# Patient Record
Sex: MALE | Race: BLACK OR AFRICAN AMERICAN | Employment: PART TIME | URBAN - METROPOLITAN AREA
[De-identification: names, ages, dates, MRNs, and addresses within clinical notes are randomized per-mention and may not be internally consistent; named-entity substitution may affect disease eponyms.]

---

## 2018-10-23 ENCOUNTER — APPOINTMENT (OUTPATIENT)
Dept: GENERAL RADIOLOGY | Age: 48
End: 2018-10-23
Attending: EMERGENCY MEDICINE
Payer: SELF-PAY

## 2018-10-23 ENCOUNTER — HOSPITAL ENCOUNTER (EMERGENCY)
Age: 48
Discharge: HOME | End: 2018-10-23
Attending: EMERGENCY MEDICINE
Payer: SELF-PAY

## 2018-10-23 VITALS
WEIGHT: 298 LBS | OXYGEN SATURATION: 98 % | HEIGHT: 74 IN | TEMPERATURE: 97.8 F | DIASTOLIC BLOOD PRESSURE: 70 MMHG | RESPIRATION RATE: 16 BRPM | BODY MASS INDEX: 38.24 KG/M2 | SYSTOLIC BLOOD PRESSURE: 113 MMHG | HEART RATE: 56 BPM

## 2018-10-23 DIAGNOSIS — M25.512 PAIN IN JOINT OF LEFT SHOULDER: Primary | ICD-10-CM

## 2018-10-23 LAB — DEPRECATED S PYO AG THROAT QL EIA: NEGATIVE

## 2018-10-23 PROCEDURE — 87880 STREP A ASSAY W/OPTIC: CPT

## 2018-10-23 PROCEDURE — 99283 EMERGENCY DEPT VISIT LOW MDM: CPT | Performed by: NURSE PRACTITIONER

## 2018-10-23 PROCEDURE — 87081 CULTURE SCREEN ONLY: CPT

## 2018-10-23 PROCEDURE — 73030 X-RAY EXAM OF SHOULDER: CPT

## 2018-10-23 RX ORDER — DICLOFENAC SODIUM 50 MG/1
50 TABLET, DELAYED RELEASE ORAL 2 TIMES DAILY
Qty: 14 TAB | Refills: 0 | Status: SHIPPED | OUTPATIENT
Start: 2018-10-23

## 2018-10-23 RX ORDER — KETOROLAC TROMETHAMINE 10 MG/1
10 TABLET, FILM COATED ORAL
Status: DISCONTINUED | OUTPATIENT
Start: 2018-10-23 | End: 2018-10-23 | Stop reason: HOSPADM

## 2018-10-23 NOTE — ED NOTES
I have reviewed discharge instructions with the patient. The patient verbalized understanding. Patient left ED via Discharge Method: ambulatory to Home with self. Opportunity for questions and clarification provided. Patient given 1 scripts. To continue your aftercare when you leave the hospital, you may receive an automated call from our care team to check in on how you are doing. This is a free service and part of our promise to provide the best care and service to meet your aftercare needs.  If you have questions, or wish to unsubscribe from this service please call [phone]. Thank you for Choosing our [facility].

## 2018-10-23 NOTE — ED PROVIDER NOTES
Patient presents with left shoulder pain for the past week. Patient denies known injury. Patient states sore throat also for the past 2 days. He denies fever. The history is provided by the patient. Shoulder Pain The incident occurred more than 2 days ago. There was no injury mechanism. The left shoulder is affected. The pain is at a severity of 10/10. The pain is mild. The pain has been intermittent since onset. The pain does not radiate. There is no history of shoulder injury. He has no other injuries. There is no history of shoulder surgery. He reports no foreign bodies present. No past medical history on file. No past surgical history on file. No family history on file. Social History Socioeconomic History  Marital status: SINGLE Spouse name: Not on file  Number of children: Not on file  Years of education: Not on file  Highest education level: Not on file Social Needs  Financial resource strain: Not on file  Food insecurity - worry: Not on file  Food insecurity - inability: Not on file  Transportation needs - medical: Not on file  Transportation needs - non-medical: Not on file Occupational History  Not on file Tobacco Use  Smoking status: Not on file Substance and Sexual Activity  Alcohol use: Not on file  Drug use: Not on file  Sexual activity: Not on file Other Topics Concern  Not on file Social History Narrative  Not on file ALLERGIES: Tetracycline Review of Systems Constitutional: Negative for chills and fever. HENT: Positive for sore throat. Musculoskeletal: Positive for arthralgias. Vitals:  
 10/23/18 1417 BP: 121/75 Pulse: 86 Resp: 16 Temp: 97.8 °F (36.6 °C) SpO2: 98% Weight: 135.2 kg (298 lb) Height: 6' 2\" (1.88 m) Physical Exam  
Constitutional: He appears well-developed and well-nourished. Non-toxic appearance. He does not appear ill.   
HENT:  
 Mouth/Throat: Uvula is midline. Posterior oropharyngeal erythema present. Tonsils are 2+ on the right. Tonsils are 2+ on the left. No tonsillar exudate. Neck: Normal range of motion. Neck supple. Cardiovascular: Normal rate and regular rhythm. Pulmonary/Chest: Breath sounds normal.  
Musculoskeletal:  
     Left shoulder: He exhibits tenderness and pain. He exhibits normal pulse and normal strength. Nursing note and vitals reviewed. Xr Shoulder Lt Ap/lat Min 2 V Result Date: 10/23/2018 Three-view left shoulder x-ray October 23, 2018 Reference exam: None Indication: Left shoulder pain and swelling beginning one week ago Findings: 3 images are presented, no fracture nor dislocation is seen. The visualized portions of the ribs and lung are normal. There is some joint space narrowing and spurring at the acromiohumeral joint. Impression: Arthritic changes but no acute injury identified. Recent Results (from the past 12 hour(s)) STREP AG SCREEN, GROUP A Collection Time: 10/23/18  2:55 PM  
Result Value Ref Range Group A Strep Ag ID NEGATIVE  NEG    
 
 
MDM Number of Diagnoses or Management Options Pain in joint of left shoulder:  
Diagnosis management comments: Xray negative for acute changes. Strep test negative for strep. Patient given po toradol prior to discharge. Patient given prescription for diclofenac. Amount and/or Complexity of Data Reviewed Clinical lab tests: ordered and reviewed Tests in the radiology section of CPT®: ordered and reviewed Tests in the medicine section of CPT®: ordered Patient Progress Patient progress: stable Procedures

## 2018-10-23 NOTE — LETTER
[address] [facility] EMERGENCY DEPT [address] [address] 
[phone] Work/School Note Date: 10/23/2018 To Whom It May concern: 
 
Nohelia Kendall was seen and treated today in the emergency room by the following provider(s): 
Attending Provider: Trice Morgan DO 
Nurse Practitioner: MINH Davis. Nohelia Kendall was seen in the Emergency Room on 10/23/2018. Sincerely, Natalie Lopez RN

## 2018-10-23 NOTE — ED TRIAGE NOTES
Patient states that he has had a sore throat for 2 days. Denies fevers. States that he has been spitting up yellow mucus. Also reports left shoulder pain that started 1 weeks ago that is not helped with mortin or tylenol. Swelling observed.

## 2018-10-23 NOTE — DISCHARGE INSTRUCTIONS
Diclofenac as prescribed  Exercises provided will also help with pain. Return to the Emergency Department for any new or worse symptoms.

## 2018-10-25 LAB
BACTERIA SPEC CULT: NORMAL
SERVICE CMNT-IMP: NORMAL